# Patient Record
Sex: FEMALE | Race: ASIAN | Employment: FULL TIME | URBAN - METROPOLITAN AREA
[De-identification: names, ages, dates, MRNs, and addresses within clinical notes are randomized per-mention and may not be internally consistent; named-entity substitution may affect disease eponyms.]

---

## 2023-02-07 LAB — MAMMOGRAPHY, EXTERNAL: NORMAL

## 2023-03-30 NOTE — PROGRESS NOTES
HPI: Delia Perez (: 1975) is a 52 y.o. female, patient, here for evaluation of the following chief complaint(s): Patient presents with complaint of right wrist pain at the radial aspect. This began one month ago. She presented to [facility] and was diagnosed with deQuervain's tenosynovitis. She was given a prescription for indomethacin which cause diarrhea. She has been wearing a thumb spice Velcro strap wrist splint while sleeping. She reports that years ago she had an injection for the same complaint and did well. She denies injury/trauma. No numbness or tingling the hand. No other complaints or concerns. Wrist Pain (Right )       Vitals:  Ht 5' 4\" (1.626 m)   Wt 134 lb (60.8 kg)   BMI 23.00 kg/m²    Body mass index is 23 kg/m². Allergies   Allergen Reactions    Cephalexin Nausea Only    Indomethacin Diarrhea       Current Outpatient Medications   Medication Sig    methylPREDNISolone (MEDROL DOSEPACK) 4 mg tablet Per dose pack instructions    ascorbic acid, vitamin C, (ascorbic acid) 100 mg tab Vitamin C     No current facility-administered medications for this visit. No past medical history on file. No past surgical history on file. No family history on file. Review of Systems    Constitutional: No fevers, chills, night sweats, excessive fatigue or weight loss. Musculoskeletal: + Right wrist pain. Neurologic: No headache, blurred vision, and no areas of focal weakness or numbness. Normal gait. No sensory problems. Respiratory: No dyspnea on exertion, orthopnea, chest pain, cough or hemoptysis. Cardiovascular: No anginal chest pain, irregular heart beat, tachycardia, palpitations or orthopnea  Integumentary: No chronic rashes, inflammation, ulcerations, pruritus, petechiae, purpura, ecchymoses, or skin changes           Physical Exam    General: Alert, cooperative, no distress  Musculosketal: Right hand - Full range of motion. Normal sensation.  No erythema, edema or warmth to the joints. No cysts. Right wrist - Full range of motion. Normal sensation. No erythema, edema or warmth to the joints. No palpable cysts. Negative Tinel's and Phalen's test. Positive Finkelstein's test.   Neurologic:  CNII-XII intact, Normal strength, sensation, and reflexes throughout    Imaging:  None today     ASSESSMENT/PLAN:  Below is the assessment and plan developed based on review of pertinent history, physical exam, labs, studies, and medications. Right wrist pain at the radial aspect. This began one month ago. She presented to [facility] and was diagnosed with deQuervain's tenosynovitis. She was given a prescription for indomethacin which cause diarrhea. She has been wearing a thumb spice Velcro strap wrist splint while sleeping. Clinical exam is consistent with the aforementioned diagnosis. Treatment options reviewed. She deferred an injection. She would like to continue the splint, try home exercises and take a steroid burst. She will follow up in 3 weeks to review her progress. 1. Tenosynovitis, de Quervain  -     methylPREDNISolone (MEDROL DOSEPACK) 4 mg tablet; Per dose pack instructions, Normal, Disp-1 Dose Pack, R-0  2. Right wrist pain    Return in about 3 weeks (around 4/21/2023). Dr. Gupta Setting was available for immediate consult during this encounter. An electronic signature was used to authenticate this note.   -- Clovis Regan PA-C

## 2023-03-31 ENCOUNTER — OFFICE VISIT (OUTPATIENT)
Dept: ORTHOPEDIC SURGERY | Age: 48
End: 2023-03-31
Payer: COMMERCIAL

## 2023-03-31 VITALS — WEIGHT: 134 LBS | HEIGHT: 64 IN | BODY MASS INDEX: 22.88 KG/M2

## 2023-03-31 DIAGNOSIS — M65.4 TENOSYNOVITIS, DE QUERVAIN: Primary | ICD-10-CM

## 2023-03-31 DIAGNOSIS — M25.531 RIGHT WRIST PAIN: ICD-10-CM

## 2023-03-31 PROCEDURE — 99203 OFFICE O/P NEW LOW 30 MIN: CPT | Performed by: PHYSICIAN ASSISTANT

## 2023-03-31 RX ORDER — METHYLPREDNISOLONE 4 MG/1
TABLET ORAL
Qty: 1 DOSE PACK | Refills: 0 | Status: SHIPPED | OUTPATIENT
Start: 2023-03-31

## 2023-04-24 NOTE — PROGRESS NOTES
HPI: Amber Shearer (: 1975) is a 52 y.o. female, patient, here for evaluation of the following chief complaint(s):  Patient presents with complaint of right wrist pain at the radial aspect. This began one month ago. She presented to [facility] and was diagnosed with deQuervain's tenosynovitis. She was given a prescription for indomethacin which cause diarrhea. She has been wearing a thumb spica Velcro strap wrist splint while sleeping. She reports that years ago she had an injection for the same complaint and did well. She denies injury/trauma. No numbness or tingling the hand. No other complaints or concerns. Patient presents in follow up. She was last seen in the office on 3/31/23. She has been treated conservatively for right deQuervain's tenosynovitis. She completed a steroid burst, has been doing home exercises and has been immobilizing in a thumb spica Velcro strap wrist splint. She has noticed improvement in the symptoms, but not 100%. 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 No other complaints or concerns. Wrist Pain (Right )       Vitals:  Ht 5' 4\" (1.626 m)   Wt 134 lb (60.8 kg)   BMI 23.00 kg/m²    Body mass index is 23 kg/m². Allergies   Allergen Reactions    Cephalexin Nausea Only    Indomethacin Diarrhea       Current Outpatient Medications   Medication Sig    methylPREDNISolone (MEDROL DOSEPACK) 4 mg tablet Per dose pack instructions (Patient not taking: Reported on 2023)    ascorbic acid, vitamin C, (ascorbic acid) 100 mg tab Vitamin C     Current Facility-Administered Medications   Medication    BUPivacaine (PF) (MARCAINE) 0.5 % (5 mg/mL) injection 5 mg    betamethasone (CELESTONE) injection 6 mg       No past medical history on file. No past surgical history on file. No family history on file.            Review of Systems    Constitutional: No fevers, chills, night sweats, excessive fatigue or weight loss. Musculoskeletal: + Right wrist pain. Neurologic: No headache, blurred vision, and no areas of focal weakness or numbness. Normal gait. No sensory problems. Respiratory: No dyspnea on exertion, orthopnea, chest pain, cough or hemoptysis. Cardiovascular: No anginal chest pain, irregular heart beat, tachycardia, palpitations or orthopnea  Integumentary: No chronic rashes, inflammation, ulcerations, pruritus, petechiae, purpura, ecchymoses, or skin changes       Physical Exam    General: Alert, cooperative, no distress  Musculosketal: Right hand - Full range of motion. Normal sensation. No erythema, edema or warmth to the joints. No cysts. Right wrist - Full range of motion. Normal sensation. No erythema, edema or warmth to the joints. No palpable cysts. Negative Tinel's and Phalen's test. Positive Finkelstein's test.  Neurologic:  CNII-XII intact, Normal strength, sensation, and reflexes throughout    Imaging:  None today     ASSESSMENT/PLAN:  Below is the assessment and plan developed based on review of pertinent history, physical exam, labs, studies, and medications. Right wrist pain at the radial aspect. This began one month ago. She presented to [facility] and was diagnosed with deQuervain's tenosynovitis. She was given a prescription for indomethacin which cause diarrhea. She has been wearing a thumb spice Velcro strap wrist splint while sleeping. Clinical exam is consistent with the aforementioned diagnosis. Treatment options reviewed. She deferred an injection. She would like to continue the splint, try home exercises and take a steroid burst.     Patient presents in follow up. She was last seen in the office on 3/31/23. She has been treated conservatively for right deQuervain's tenosynovitis. She completed a steroid burst, has been doing home exercises and has been immobilizing in a thumb spica Velcro strap wrist splint.  She has noticed improvement in the symptoms, but not 100%. 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 Injection administered to the right wrist first compartment on 4/25/23. She will follow up in 3 weeks to review her progress. Patient consent obtained prior to the injection(s). Consent forms signed. Discussed risks/benefits of cortisone injection and patient gave verbal consent. Under sterile conditions, the right wrist first compartment was injected with 1 cc 0.5% Bupivacaine and 1cc 6mg Betamethasone. She tolerated the procedure well. 1. Tenosynovitis, de Quervain  -     INJECT TENDON SHEATH/LIGAMENT  -     BUPivacaine (PF) (MARCAINE) 0.5 % (5 mg/mL) injection 5 mg; 5 mg, Other, ONCE, 1 dose, On Tue 4/25/23 at 1200  -     betamethasone (CELESTONE) injection 6 mg; 6 mg, Other, ONCE, 1 dose, On Tue 4/25/23 at 1200  2. Right wrist pain      Return in about 3 weeks (around 5/16/2023). Dr. Gadiel Flores was available for immediate consult during this encounter. An electronic signature was used to authenticate this note.   -- Lv Noguera PA-C

## 2023-04-25 ENCOUNTER — OFFICE VISIT (OUTPATIENT)
Dept: ORTHOPEDIC SURGERY | Age: 48
End: 2023-04-25

## 2023-04-25 VITALS — BODY MASS INDEX: 22.88 KG/M2 | WEIGHT: 134 LBS | HEIGHT: 64 IN

## 2023-04-25 DIAGNOSIS — M65.4 TENOSYNOVITIS, DE QUERVAIN: Primary | ICD-10-CM

## 2023-04-25 DIAGNOSIS — M25.531 RIGHT WRIST PAIN: ICD-10-CM

## 2023-04-25 RX ORDER — BETAMETHASONE SODIUM PHOSPHATE AND BETAMETHASONE ACETATE 3; 3 MG/ML; MG/ML
6 INJECTION, SUSPENSION INTRA-ARTICULAR; INTRALESIONAL; INTRAMUSCULAR; SOFT TISSUE ONCE
Status: COMPLETED | OUTPATIENT
Start: 2023-04-25 | End: 2023-04-25

## 2023-04-25 RX ORDER — BUPIVACAINE HYDROCHLORIDE 5 MG/ML
5 INJECTION, SOLUTION EPIDURAL; INTRACAUDAL ONCE
Status: COMPLETED | OUTPATIENT
Start: 2023-04-25 | End: 2023-04-25

## 2023-04-25 RX ADMIN — BETAMETHASONE SODIUM PHOSPHATE AND BETAMETHASONE ACETATE 6 MG: 3; 3 INJECTION, SUSPENSION INTRA-ARTICULAR; INTRALESIONAL; INTRAMUSCULAR; SOFT TISSUE at 11:18

## 2023-04-25 RX ADMIN — BUPIVACAINE HYDROCHLORIDE 5 MG: 5 INJECTION, SOLUTION EPIDURAL; INTRACAUDAL at 11:18

## 2023-06-21 ENCOUNTER — OFFICE VISIT (OUTPATIENT)
Age: 48
End: 2023-06-21
Payer: COMMERCIAL

## 2023-06-21 VITALS
HEIGHT: 64 IN | RESPIRATION RATE: 16 BRPM | BODY MASS INDEX: 24.11 KG/M2 | TEMPERATURE: 96.2 F | DIASTOLIC BLOOD PRESSURE: 80 MMHG | SYSTOLIC BLOOD PRESSURE: 114 MMHG | HEART RATE: 54 BPM | OXYGEN SATURATION: 100 % | WEIGHT: 141.2 LBS

## 2023-06-21 DIAGNOSIS — Z13.220 SCREENING CHOLESTEROL LEVEL: ICD-10-CM

## 2023-06-21 DIAGNOSIS — Z76.89 ESTABLISHING CARE WITH NEW DOCTOR, ENCOUNTER FOR: ICD-10-CM

## 2023-06-21 DIAGNOSIS — Z11.4 SCREENING FOR HIV (HUMAN IMMUNODEFICIENCY VIRUS): ICD-10-CM

## 2023-06-21 DIAGNOSIS — Z00.00 ANNUAL PHYSICAL EXAM: Primary | ICD-10-CM

## 2023-06-21 DIAGNOSIS — Z13.1 SCREENING FOR DIABETES MELLITUS: ICD-10-CM

## 2023-06-21 DIAGNOSIS — Z79.899 ENCOUNTER FOR LONG-TERM (CURRENT) USE OF MEDICATIONS: ICD-10-CM

## 2023-06-21 DIAGNOSIS — Z11.59 NEED FOR HEPATITIS C SCREENING TEST: ICD-10-CM

## 2023-06-21 PROCEDURE — 99386 PREV VISIT NEW AGE 40-64: CPT | Performed by: FAMILY MEDICINE

## 2023-06-21 SDOH — ECONOMIC STABILITY: FOOD INSECURITY: WITHIN THE PAST 12 MONTHS, THE FOOD YOU BOUGHT JUST DIDN'T LAST AND YOU DIDN'T HAVE MONEY TO GET MORE.: NEVER TRUE

## 2023-06-21 SDOH — ECONOMIC STABILITY: HOUSING INSECURITY
IN THE LAST 12 MONTHS, WAS THERE A TIME WHEN YOU DID NOT HAVE A STEADY PLACE TO SLEEP OR SLEPT IN A SHELTER (INCLUDING NOW)?: NO

## 2023-06-21 SDOH — ECONOMIC STABILITY: FOOD INSECURITY: WITHIN THE PAST 12 MONTHS, YOU WORRIED THAT YOUR FOOD WOULD RUN OUT BEFORE YOU GOT MONEY TO BUY MORE.: NEVER TRUE

## 2023-06-21 SDOH — ECONOMIC STABILITY: INCOME INSECURITY: HOW HARD IS IT FOR YOU TO PAY FOR THE VERY BASICS LIKE FOOD, HOUSING, MEDICAL CARE, AND HEATING?: NOT HARD AT ALL

## 2023-06-21 ASSESSMENT — PATIENT HEALTH QUESTIONNAIRE - PHQ9
2. FEELING DOWN, DEPRESSED OR HOPELESS: 0
SUM OF ALL RESPONSES TO PHQ QUESTIONS 1-9: 0
1. LITTLE INTEREST OR PLEASURE IN DOING THINGS: 0
SUM OF ALL RESPONSES TO PHQ QUESTIONS 1-9: 0
SUM OF ALL RESPONSES TO PHQ9 QUESTIONS 1 & 2: 0
SUM OF ALL RESPONSES TO PHQ QUESTIONS 1-9: 0
SUM OF ALL RESPONSES TO PHQ QUESTIONS 1-9: 0

## 2023-06-21 ASSESSMENT — ANXIETY QUESTIONNAIRES
IF YOU CHECKED OFF ANY PROBLEMS ON THIS QUESTIONNAIRE, HOW DIFFICULT HAVE THESE PROBLEMS MADE IT FOR YOU TO DO YOUR WORK, TAKE CARE OF THINGS AT HOME, OR GET ALONG WITH OTHER PEOPLE: NOT DIFFICULT AT ALL
5. BEING SO RESTLESS THAT IT IS HARD TO SIT STILL: 0
2. NOT BEING ABLE TO STOP OR CONTROL WORRYING: 0
7. FEELING AFRAID AS IF SOMETHING AWFUL MIGHT HAPPEN: 0
4. TROUBLE RELAXING: 0
1. FEELING NERVOUS, ANXIOUS, OR ON EDGE: 0
6. BECOMING EASILY ANNOYED OR IRRITABLE: 0
3. WORRYING TOO MUCH ABOUT DIFFERENT THINGS: 0
GAD7 TOTAL SCORE: 0

## 2023-06-21 NOTE — PROGRESS NOTES
Bhavesh Matt is a 52 y.o. female, who's a new patient to our practice. Annual exam    Previous PCP: none       has no past medical history on file. Assessment/Plans: Te Rodriguez was seen today for new patient. Diagnoses and all orders for this visit:    Annual physical exam  -     CBC  -     Comprehensive Metabolic Panel  -     TSH  -     Lipid Panel  -     Hemoglobin A1C  -     HIV 1/2 Ag/Ab, 4TH Generation,W Rflx Confirm  -     Hepatitis C Antibody  -     Urinalysis with Microscopic    Establishing care with new doctor, encounter for    Encounter for long-term (current) use of medications  -     CBC  -     Comprehensive Metabolic Panel  -     TSH  -     Lipid Panel  -     Hemoglobin A1C  -     HIV 1/2 Ag/Ab, 4TH Generation,W Rflx Confirm  -     Hepatitis C Antibody  -     Urinalysis with Microscopic    Screening for diabetes mellitus  -     Hemoglobin A1C    Screening cholesterol level  -     Lipid Panel    Need for hepatitis C screening test  -     Hepatitis C Antibody    Screening for HIV (human immunodeficiency virus)  -     HIV 1/2 Ag/Ab, 4TH Generation,W Rflx Confirm      Discussed plans, risk/benefits of treatments/observations. Through the use of shared decision making, above plans were agreed upon. Medication compliance advised. Patient verbalized understanding. Return for as needed or if labs abnormal.       Subjective:    Left great toe pain on and off for past few years. Pain is sharp sudden lasting seconds and goes away. Last was 4 days ago. Denies swelling when it comes or redness. Denies it being sensitive to touch or pressure.      Orthopedic Dr. Jeannine Quiroz    OBGYN: Dr. Demarco Lemus      Vitals:    06/21/23 0924   BP: 114/80   Pulse: 54   Resp: 16   Temp: (!) 96.2 °F (35.7 °C)   TempSrc: Oral   SpO2: 100%   Weight: 141 lb 3.2 oz (64 kg)   Height: 5' 3.5\" (1.613 m)     General appearance: alert, cooperative, no distress, appears stated age  Neurologic: Alert and oriented

## 2023-06-22 LAB
ALBUMIN SERPL-MCNC: 4.7 G/DL (ref 3.8–4.8)
ALBUMIN/GLOB SERPL: 1.5 {RATIO} (ref 1.2–2.2)
ALP SERPL-CCNC: 91 IU/L (ref 44–121)
ALT SERPL-CCNC: 28 IU/L (ref 0–32)
APPEARANCE UR: CLEAR
AST SERPL-CCNC: 26 IU/L (ref 0–40)
BACTERIA #/AREA URNS HPF: NORMAL /[HPF]
BILIRUB SERPL-MCNC: 0.2 MG/DL (ref 0–1.2)
BILIRUB UR QL STRIP: NEGATIVE
BUN SERPL-MCNC: 18 MG/DL (ref 6–24)
BUN/CREAT SERPL: 19 (ref 9–23)
CALCIUM SERPL-MCNC: 10 MG/DL (ref 8.7–10.2)
CASTS URNS QL MICRO: NORMAL /LPF
CHLORIDE SERPL-SCNC: 100 MMOL/L (ref 96–106)
CHOLEST SERPL-MCNC: 301 MG/DL (ref 100–199)
CO2 SERPL-SCNC: 24 MMOL/L (ref 20–29)
COLOR UR: YELLOW
CREAT SERPL-MCNC: 0.95 MG/DL (ref 0.57–1)
EGFRCR SERPLBLD CKD-EPI 2021: 74 ML/MIN/1.73
EPI CELLS #/AREA URNS HPF: NORMAL /HPF (ref 0–10)
ERYTHROCYTE [DISTWIDTH] IN BLOOD BY AUTOMATED COUNT: 12.4 % (ref 11.7–15.4)
GLOBULIN SER CALC-MCNC: 3.1 G/DL (ref 1.5–4.5)
GLUCOSE SERPL-MCNC: 95 MG/DL (ref 70–99)
GLUCOSE UR QL STRIP: NEGATIVE
HBA1C MFR BLD: 5.7 % (ref 4.8–5.6)
HCT VFR BLD AUTO: 40.1 % (ref 34–46.6)
HCV IGG SERPL QL IA: NON REACTIVE
HDLC SERPL-MCNC: 72 MG/DL
HGB BLD-MCNC: 12.8 G/DL (ref 11.1–15.9)
HGB UR QL STRIP: NEGATIVE
HIV 1+2 AB+HIV1 P24 AG SERPL QL IA: NON REACTIVE
KETONES UR QL STRIP: NEGATIVE
LABORATORY COMMENT REPORT: ABNORMAL
LDLC SERPL CALC-MCNC: 211 MG/DL (ref 0–99)
LEUKOCYTE ESTERASE UR QL STRIP: NEGATIVE
MCH RBC QN AUTO: 27.5 PG (ref 26.6–33)
MCHC RBC AUTO-ENTMCNC: 31.9 G/DL (ref 31.5–35.7)
MCV RBC AUTO: 86 FL (ref 79–97)
MICRO URNS: NORMAL
MICRO URNS: NORMAL
NITRITE UR QL STRIP: NEGATIVE
PH UR STRIP: 6 [PH] (ref 5–7.5)
PLATELET # BLD AUTO: 549 X10E3/UL (ref 150–450)
POTASSIUM SERPL-SCNC: 4.4 MMOL/L (ref 3.5–5.2)
PROT SERPL-MCNC: 7.8 G/DL (ref 6–8.5)
PROT UR QL STRIP: NEGATIVE
RBC # BLD AUTO: 4.66 X10E6/UL (ref 3.77–5.28)
RBC #/AREA URNS HPF: NORMAL /HPF (ref 0–2)
SODIUM SERPL-SCNC: 139 MMOL/L (ref 134–144)
SP GR UR STRIP: 1.01 (ref 1–1.03)
TRIGL SERPL-MCNC: 105 MG/DL (ref 0–149)
TSH SERPL DL<=0.005 MIU/L-ACNC: 1.76 UIU/ML (ref 0.45–4.5)
UROBILINOGEN UR STRIP-MCNC: 0.2 MG/DL (ref 0.2–1)
VLDLC SERPL CALC-MCNC: 18 MG/DL (ref 5–40)
WBC # BLD AUTO: 8.4 X10E3/UL (ref 3.4–10.8)
WBC #/AREA URNS HPF: NORMAL /HPF (ref 0–5)

## 2023-08-23 ENCOUNTER — TELEPHONE (OUTPATIENT)
Age: 48
End: 2023-08-23

## 2023-08-23 NOTE — TELEPHONE ENCOUNTER
Message  Received: MD Magda Sagastume LPN  Abnormal result     Please make pt an apt to discuss result. Edmar Armenta MD   8/22/2023       LM for patient to call and schedule appt to review labs.

## 2023-10-10 ENCOUNTER — TELEMEDICINE (OUTPATIENT)
Age: 48
End: 2023-10-10
Payer: COMMERCIAL

## 2023-10-10 DIAGNOSIS — R73.03 PREDIABETES: ICD-10-CM

## 2023-10-10 DIAGNOSIS — Z79.899 ENCOUNTER FOR LONG-TERM (CURRENT) USE OF MEDICATIONS: ICD-10-CM

## 2023-10-10 DIAGNOSIS — E78.00 PURE HYPERCHOLESTEROLEMIA: Primary | ICD-10-CM

## 2023-10-10 DIAGNOSIS — Z12.11 SCREEN FOR COLON CANCER: ICD-10-CM

## 2023-10-10 DIAGNOSIS — D75.839 THROMBOCYTOSIS: ICD-10-CM

## 2023-10-10 PROCEDURE — 99213 OFFICE O/P EST LOW 20 MIN: CPT | Performed by: FAMILY MEDICINE

## 2023-10-10 NOTE — PROGRESS NOTES
Chief Complaint   Patient presents with    Discuss Labs       1. Have you been to the ER, urgent care clinic since your last visit? Hospitalized since your last visit? No    2. Have you seen or consulted any other health care providers outside of the [address] [place] [address] since your last visit? Include any pap smears or colon screening.  No
MD on 10/10/2023 at 4:08 PM    An electronic signature was used to authenticate this note.

## 2025-08-06 ENCOUNTER — OFFICE VISIT (OUTPATIENT)
Age: 50
End: 2025-08-06

## 2025-08-06 VITALS
SYSTOLIC BLOOD PRESSURE: 130 MMHG | OXYGEN SATURATION: 97 % | DIASTOLIC BLOOD PRESSURE: 84 MMHG | HEART RATE: 67 BPM | BODY MASS INDEX: 25.04 KG/M2 | RESPIRATION RATE: 16 BRPM | TEMPERATURE: 98 F | WEIGHT: 143.6 LBS

## 2025-08-06 DIAGNOSIS — J06.9 VIRAL UPPER RESPIRATORY TRACT INFECTION: Primary | ICD-10-CM

## 2025-08-06 DIAGNOSIS — R05.1 ACUTE COUGH: ICD-10-CM

## 2025-08-06 DIAGNOSIS — R09.81 NASAL CONGESTION: ICD-10-CM

## 2025-08-06 LAB
Lab: NORMAL
PERFORMING INSTRUMENT: NORMAL
QC PASS/FAIL: NORMAL
SARS-COV-2, POC: NORMAL

## 2025-08-06 RX ORDER — BENZONATATE 100 MG/1
100-200 CAPSULE ORAL 3 TIMES DAILY PRN
Qty: 21 CAPSULE | Refills: 0 | Status: SHIPPED | OUTPATIENT
Start: 2025-08-06 | End: 2025-08-13

## 2025-08-06 ASSESSMENT — ENCOUNTER SYMPTOMS
SORE THROAT: 0
RHINORRHEA: 0
CHEST TIGHTNESS: 0
NAUSEA: 1
COUGH: 1
SINUS PRESSURE: 1
SHORTNESS OF BREATH: 0